# Patient Record
(demographics unavailable — no encounter records)

---

## 2025-01-06 NOTE — IMAGING
[Right] : right knee [AP] : anteroposterior [Lateral] : lateral [[place]] : [place] [advanced tricompartmental OA with medial compartment narrowing and varus alignment] : advanced tricompartmental OA with medial compartment narrowing and varus alignment [de-identified] : Right knee: Old healed inc anterior knee.  Mild effusion.  No tenderness.  Crepitus.  ROM 5-90.  Lig stable.  NVI.  Walker.

## 2025-01-06 NOTE — DISCUSSION/SUMMARY
[de-identified] : The natural progression of Osteoarthritis was explained to the patient.  We discussed the possible treatment options from conservative to operative.  These included NSAIDS, Glucosamine and Chondrotin sulfate, and Physical Therapy as well different types of injections.  We also discussed that at some point they may progress to needed a TKA.  Information and pamphlets were given when appropriate.

## 2025-01-06 NOTE — HISTORY OF PRESENT ILLNESS
[de-identified] : 1/6/25: Spontaneous DVT/PE end of Nov 2024 now on eliquis.  While in hospital for this had swelling in right knee.  Denies pain.  Had fluid aspirated while there and was told to f/u with ortho.  Prior right knee open surgen ?meniscectomy 1960s.  Using walker currently for balance, prior to this was using cane.  H/O CVA 4 years ago. [] : no [FreeTextEntry1] : Right knee  [FreeTextEntry5] : Chronic right knee pain for the past couple months, No injury. Having medial pain. Went to the ER 12/2/24, Due to posterior swelling. Stated he had embolism. Was given hep. Using Tylenol for relief. Using walker.  h/o right sx 1990

## 2025-01-06 NOTE — ASSESSMENT
[FreeTextEntry1] : Severe OA right knee, acute exacerbation from recent hospitalization for PE/DVT.  Discussed treatments and will try PT for now.  Discussed future injections if needed.

## 2025-01-10 NOTE — HISTORY OF PRESENT ILLNESS
[de-identified] : Patient is an 89 M PMHx carotid artery stenosis (previously on Aspirin/Plavix), non-obstructive CAD, OA, previously followed by Dr. Coppola for MGUS. Presents for follow up for PE/DVT. Patient states during Thanksgiving, was more sedentary than usual. Developed a large effusion of right knee preventing from walking. One week later, developed severe pain and swelling of RLE. Presented to PCP, who sent him for CTA Chest and DVT study.  CTA Chest from [facility] showed web-like filing defects within the subsegmental pulmonary arteries of RLL favoring chronic pulmonary embolism as well as generalized bronchial wall thickening representing infections vs inflammatory bronchitis. Was hospitalized at [facility] from 12/2-12/3. Placed on heparin drip and then transitioned to Eliquis. DVT study from [facility] showed post thrombotic changes within gastrocnemius and popliteal veins but no DVT in either leg. No previous thrombosis events. No FHx thrombosis. Daughter with history of recurrent miscarriages, pre-eclampsia. Never had colonoscopy. No personal hx of malignancy.

## 2025-01-10 NOTE — HISTORY OF PRESENT ILLNESS
[de-identified] : Patient is an 89 M PMHx carotid artery stenosis (previously on Aspirin/Plavix), non-obstructive CAD, OA, previously followed by Dr. Coppola for MGUS. Presents for follow up for PE/DVT. Patient states during Thanksgiving, was more sedentary than usual. Developed a large effusion of right knee preventing from walking. One week later, developed severe pain and swelling of RLE. Presented to PCP, who sent him for CTA Chest and DVT study.  CTA Chest from [facility] showed web-like filing defects within the subsegmental pulmonary arteries of RLL favoring chronic pulmonary embolism as well as generalized bronchial wall thickening representing infections vs inflammatory bronchitis. Was hospitalized at [facility] from 12/2-12/3. Placed on heparin drip and then transitioned to Eliquis. DVT study from [facility] showed post thrombotic changes within gastrocnemius and popliteal veins but no DVT in either leg. No previous thrombosis events. No FHx thrombosis. Daughter with history of recurrent miscarriages, pre-eclampsia. Never had colonoscopy. No personal hx of malignancy.

## 2025-01-10 NOTE — ASSESSMENT
[FreeTextEntry1] : Patient is an 89 M PMHx carotid artery stenosis (previously on Aspirin/Plavix), non-obstructive CAD, OA, previously followed by Dr. Coppola for MGUS. Presents for follow up for PE/DVT. Patient states during Thanksgiving, was more sedentary than usual. Developed a large effusion of right knee preventing from walking. One week later, developed severe pain and swelling of RLE. Presented to PCP, who sent him for CTA Chest and DVT study. CTA Chest from [facility] showed web-like filing defects within the subsegmental pulmonary arteries of RLL favoring chronic pulmonary embolism as well as generalized bronchial wall thickening representing infections vs inflammatory bronchitis. Was hospitalized at [facility] from 12/2-12/3. Placed on heparin drip and then transitioned to Eliquis. DVT study from [facility] showed post thrombotic changes within gastrocnemius and popliteal veins but no DVT in either leg. No previous thrombosis events. No FHx thrombosis. Daughter with history of recurrent miscarriages, pre-eclampsia. Never had colonoscopy. No personal hx of malignancy.   Plan: - Unclear if provoked vs unprovoked - Plan for3 months AC for segmental PE - Hypercoagulable work-up negative - Will request records from [place] and [facility] - Repeat CTA Chest in 3 months

## 2025-01-10 NOTE — ASSESSMENT
[FreeTextEntry1] : Patient is an 89 M PMHx carotid artery stenosis (previously on Aspirin/Plavix), non-obstructive CAD, OA, previously followed by Dr. Coppola for MGUS. Presents for follow up for PE/DVT. Patient states during Thanksgiving, was more sedentary than usual. Developed a large effusion of right knee preventing from walking. One week later, developed severe pain and swelling of RLE. Presented to PCP, who sent him for CTA Chest and DVT study. CTA Chest from [facility] showed web-like filing defects within the subsegmental pulmonary arteries of RLL favoring chronic pulmonary embolism as well as generalized bronchial wall thickening representing infections vs inflammatory bronchitis. Was hospitalized at [facility] from 12/2-12/3. Placed on heparin drip and then transitioned to Eliquis. DVT study from [facility] showed post thrombotic changes within gastrocnemius and popliteal veins but no DVT in either leg. No previous thrombosis events. No FHx thrombosis. Daughter with history of recurrent miscarriages, pre-eclampsia. Never had colonoscopy. No personal hx of malignancy.   Plan: - Unclear if provoked vs unprovoked - Plan for3 months AC for segmental PE - Hypercoagulable work-up negative - Will request records from [place] and [facility] - Repeat CTA Chest in 3 months

## 2025-03-13 NOTE — REVIEW OF SYSTEMS
[Fatigue] : fatigue [Dyspnea on Exertion] : dyspnea on exertion [Negative] : Psychiatric [Fever] : no fever [Chills] : no chills [Night Sweats] : no night sweats [Shortness Of Breath] : no shortness of breath [Wheezing] : no wheezing [Cough] : no cough

## 2025-03-13 NOTE — HISTORY OF PRESENT ILLNESS
[Post-hospitalization from ___ Hospital] : Post-hospitalization from [unfilled] Hospital [Admitted on: ___] : The patient was admitted on [unfilled] [Discharged on ___] : discharged on [unfilled] [Discharge Summary] : discharge summary [Discharge Med List] : discharge medication list [Med Reconciliation] : medication reconciliation has been completed [Patient Contacted By: ____] : and contacted by [unfilled] [FreeTextEntry2] : Copied from EMR,"Hospital Course 89 year-old male with history of recent stroke, DVT, PE on eliquis, hypertension hyperlipidemia, Asthma presents with generalized weakness,elevated blood pressure and non producitve cough with mild shortness of breaththat  began on friday 3/7/25. Patient is able to walk but is very weak and tired. In ED pt as mildly elevated trop X2 and was started on heparin drip. Pt also positive for Flu A." Pt seen in his home for transitional care management w/spouse and dtr present. He reports no cough, SOB, some SMITH that is worse than baseline. No fever, chills, no wheezing noted. He saw PCP who began PRednisone and Doxy,

## 2025-03-13 NOTE — PHYSICAL EXAM
[No Acute Distress] : no acute distress [Well Nourished] : well nourished [Well Developed] : well developed [Well-Appearing] : well-appearing [No Respiratory Distress] : no respiratory distress  [No Accessory Muscle Use] : no accessory muscle use [Normal Rate] : normal rate  [Regular Rhythm] : with a regular rhythm [Normal S1, S2] : normal S1 and S2 [No Murmur] : no murmur heard [Pedal Pulses Present] : the pedal pulses are present [No Edema] : there was no peripheral edema [Coordination Grossly Intact] : coordination grossly intact [No Focal Deficits] : no focal deficits [Speech Grossly Normal] : speech grossly normal [Normal Affect] : the affect was normal [Alert and Oriented x3] : oriented to person, place, and time [Normal Mood] : the mood was normal [Normal Insight/Judgement] : insight and judgment were intact [de-identified] : RLL DIMINISHED

## 2025-04-11 NOTE — END OF VISIT
[Time Spent: ___ minutes] : I have spent [unfilled] minutes of time on the encounter which excludes teaching and separately reported services. Body Location Override (Optional - Billing Will Still Be Based On Selected Body Map Location If Applicable): right forehead Detail Level: Detailed Add 99566 Cpt? (Important Note: In 2017 The Use Of 20864 Is Being Tracked By Cms To Determine Future Global Period Reimbursement For Global Periods): yes

## 2025-04-11 NOTE — HISTORY OF PRESENT ILLNESS
[de-identified] : Patient is an 89 M PMHx carotid artery stenosis (on Plavix), non-obstructive CAD, OA, previously followed by Dr. Coppola for MGUS. Presents for follow up for PE/DVT.  Patient states during Thanksgiving, was more sedentary than usual. Developed a large effusion of right knee preventing from walking. One week later, developed severe pain and swelling of RLE. Presented to PCP, who sent him for CTA Chest and DVT study.  CTA Chest from [facility] showed web-like filing defects within the subsegmental pulmonary arteries of RLL favoring chronic pulmonary embolism as well as generalized bronchial wall thickening representing infections vs inflammatory bronchitis. Was hospitalized at [facility] from 12/2-12/3. Placed on heparin drip and then transitioned to Eliquis. DVT study from [facility] showed post thrombotic changes within gastrocnemius and popliteal veins but no DVT in either leg. No previous thrombosis events. No FHx thrombosis. Daughter with history of recurrent miscarriages, pre-eclampsia. Never had colonoscopy. No personal hx of malignancy.   Interval History: Records from [facility], [place] and [facility] reveal RLL PE is chronic and present since 2019. As patient with reduced mobility and persistent PE, recommend patient remain on anticoagulation. Tolerating well, no complaints.

## 2025-04-11 NOTE — ASSESSMENT
[FreeTextEntry1] : Patient is an 89 M PMHx carotid artery stenosis (previously on Aspirin/Plavix), non-obstructive CAD, OA, previously followed by Dr. Coppola for MGUS. Presents for follow up for PE/DVT.  Patient states during Thanksgiving, was more sedentary than usual. Developed a large effusion of right knee preventing from walking. One week later, developed severe pain and swelling of RLE. Presented to PCP, who sent him for CTA Chest and DVT study. CTA Chest from [facility] showed web-like filing defects within the subsegmental pulmonary arteries of RLL favoring chronic pulmonary embolism as well as generalized bronchial wall thickening representing infections vs inflammatory bronchitis. Was hospitalized at [facility] from 12/2-12/3. Placed on heparin drip and then transitioned to Eliquis. DVT study from [facility] showed post thrombotic changes within gastrocnemius and popliteal veins but no DVT in either leg. No previous thrombosis events. No FHx thrombosis. Daughter with history of recurrent miscarriages, pre-eclampsia. Never had colonoscopy. No personal hx of malignancy.   Plan: - Unclear if provoked vs unprovoked - Hypercoagulable work-up negative - Records from [facility], [place] and [facility] reveal RLL PE is chronic and present since 2019. US Duplex negative for DVT.  - As patient with reduced mobility and persistent PE, recommend patient remain on anticoagulation. Continue Eliquis 5 mg BID.